# Patient Record
(demographics unavailable — no encounter records)

---

## 2024-10-14 NOTE — ASSESSMENT
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  Neurologic examination is reassuring today.   She is seizure free on her current dosing of Keppra and her EEG compared to prior seems largely unchanged.  Given that she is overall doing well in her development, schooling, and has no seizures on this current dose, we  can continue her current dose of medication and possibly obtain an EEG within 6 months time to assess if the background slowing has resolved.  Her genetics is still pending  for which I will follow up on.

## 2024-10-14 NOTE — DEVELOPMENTAL MILESTONES
[Brushes teeth, no help] : brushes teeth, no help [Dresses self, no help] : dresses self, no help [Imaginative play] : imaginative play [Plays board/card games] : plays board/card games [Interacts with peers] : interacts with peers [Draws person with 3 parts] : draws person with 3 parts [Copies a cross] : copies a cross [Copies a [place]] : copies a [place] [Uses 3 objects] : uses 3 objects [Knows first & last name, age, gender] : knows first & last name, age, gender [Understandable speech 100% of time] : understandable speech 100% of time [Knows 4 colors] : knows 4 colors [Defines 5 words] : defines 5 words [Names 4 colors] : names 4 colors [Knows 4 actions] : knows 4 actions [Hops on one foot] : hops on one foot [Balances on one foot for 3-5 seconds] : balances on one foot for 3-5 seconds [Prepares cereal] : does not prepare cereal [Knows 2 opposites] : does not know 2 opposites [FreeTextEntry3] : Gross Motor: runs, alternates feet while walking up steps Fine Motor: writes her own name, copies a cross/[place] Speech: can tell stories, no concerns from the teacher Social: makes friends easily in school

## 2024-10-14 NOTE — PHYSICAL EXAM
[Well-appearing] : well-appearing [Normocephalic] : normocephalic [No dysmorphic facial features] : no dysmorphic facial features [No ocular abnormalities] : no ocular abnormalities [Neck supple] : neck supple [Heart sounds regular in rate and rhythm] : heart sounds regular in rate and rhythm [No abnormal neurocutaneous stigmata or skin lesions] : no abnormal neurocutaneous stigmata or skin lesions [Straight] : straight [No faisal or dimples] : no faisal or dimples [No deformities] : no deformities [Alert] : alert [Well related, good eye contact] : well related, good eye contact [Conversant] : conversant [Normal speech and language] : normal speech and language [Follows instructions well] : follows instructions well [VFF] : VFF [Pupils reactive to light and accommodation] : pupils reactive to light and accommodation [Full extraocular movements] : full extraocular movements [No nystagmus] : no nystagmus [Normal facial sensation to light touch] : normal facial sensation to light touch [No facial asymmetry or weakness] : no facial asymmetry or weakness [Gross hearing intact] : gross hearing intact [Equal palate elevation] : equal palate elevation [Good shoulder shrug] : good shoulder shrug [Normal tongue movement] : normal tongue movement [Midline tongue, no fasciculations] : midline tongue, no fasciculations [Normal axial and appendicular muscle tone] : normal axial and appendicular muscle tone [Gets up on table without difficulty] : gets up on table without difficulty [No abnormal involuntary movements] : no abnormal involuntary movements [Walks and runs well] : walks and runs well [2+ biceps] : 2+ biceps [Triceps] : triceps [Knee jerks] : knee jerks [Ankle jerks] : ankle jerks [No ankle clonus] : no ankle clonus [No dysmetria on FTNT] : no dysmetria on FTNT [Good walking balance] : good walking balance [Normal gait] : normal gait [de-identified] : gets up from seated position on floor without assistance of hands; can stand on one foot and hop on each foot bilaterally [de-identified] : Intact to light touch bilaterally thoughout

## 2024-10-14 NOTE — REASON FOR VISIT
[Follow-Up Evaluation] : a follow-up evaluation for [Seizure Disorder] : seizure disorder [Parents] : parents [Interpreters_IDNumber] : [id number] [Interpreters_FullName] : Angel Bell [TWNoteComboBox1] : [place]

## 2024-10-14 NOTE — HISTORY OF PRESENT ILLNESS
[FreeTextEntry1] : Stefani is a 4-year-old little girl with unclassified focal epilepsy who is presenting for follow up.  She was last seen in clinic on July 29, 2024.  *********************************  At last visit, we had noted the following: In mid-July, Stefani presented to [facility] [place] for a seizure lasting 40 seconds.  She was loaded with 20 mg/kg of Keppra and her dose was increased to 500 mg BID (5 mL BID).  Her Keppra level at that time was 15.6.  This seizure appeared with facial grimacing, unresponsiveness, bilateral arm and leg stiffening.  Stefani remembers the start of the last seizure according to the parents.  It occurred at 1:40pm shortly after waking up from her nap.  Parents report that she moves around a lot while she sleeps according to parents.  She sometimes does wake up from these movements (it is not her limbs that wake her up) but she does not appear sleepy upon awakening.  Recommendations at last visit: [ ] Continue Keppra 500 mg BID (62 mg/kg/day) [ ] Labs today: CBC, CMP, Keppra Level, CRP, Ferritin, Comprehensive Epilepsy Panel [ ] Routine EEG followed by inpatient EEG - phone number given to parent to schedule [ ] Follow up 4-5 months.  *********************************  Interval History:  -She was recently admitted to the hospital for an inpatient Video EEG (results below) -After inpatient admission, the patient was advised to implement differential dosing of Keppra (400 mg AM/600 mg PM) -Ferritin level was 44 -Since discharge from the hospital, no further seizures  *********************************  Past history Reviewed [] Epilepsy Syndrome: focal epilepsy (SeLECTS?) [] Age of Diagnosis: 3 years of age [] Number of Life-Time Seizures: 1 [] History of Status Epilepticus: yes (20 minutes) [] Handedness: right-handed [] Semiology: left-side arm and leg shaking followed by full body shaking, with deviation of the eyes upward and drooling from the mouth.  Noted to have been weak on the left side after her seizure; occurred at nighttime as she was falling asleep. [] Last Event/Seizure: July 2024 [] Duration of Event/Seizure: longest 20 minutes (most recent was only 40 seconds) [] EEG (Last EEG done August 2023): - Sleep potentiated spikes right centroparietal-occipital spikes. [ ] EEG Inpatient (July 2024): - Occasional Cz/Pz midline spikes, that become more frequent during sleep, at times in runs up to 5 seconds during sleep.   - Mild generalized background slowing.  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

## 2024-10-14 NOTE — CONSULT LETTER
[Dear  ___] : Dear  [unfilled], [Consult Letter:] : I had the pleasure of evaluating your patient, [unfilled]. [Please see my note below.] : Please see my note below. [Consult Closing:] : Thank you very much for allowing me to participate in the care of this patient.  If you have any questions, please do not hesitate to contact me. [Sincerely,] : Sincerely, [FreeTextEntry3] : Angel Bell MD PGY-5, Child Neurology [facility] [address], [address] [place] [address]  Dr. Kirill Jeong Child Neurology Attending Physician [facility] [address], [address] [place] [address]

## 2024-10-14 NOTE — BIRTH HISTORY
[At Term] : at term [place] [Normal Vaginal Route] : by normal vaginal route [None] : there were no delivery complications [FreeTextEntry6] : none

## 2024-10-14 NOTE — QUALITY MEASURES
[Seizure frequency] : Seizure frequency: Yes [Etiology, seizure type, and epilepsy syndrome] : Etiology, seizure type, and epilepsy syndrome: Yes [Side effects of anti-seizure medications] : Side effects of anti-seizure medications: Yes [Safety and education around seizures] : Safety and education around seizures: Yes [Sudden unexpected death in epilepsy (SUDEP)] : Sudden unexpected death in epilepsy: Yes [Adherence to medication(s)] : Adherence to medication(s): Yes [Issues around driving] : Issues around driving: Not Applicable [Screening for anxiety, depression] : Screening for anxiety, depression: Not Applicable [Treatment-resistant epilepsy (every visit)] : Treatment-resistant epilepsy (every visit): Not Applicable [Counseling for women of childbearing potential with epilepsy (including folic acid supplement)] : Counseling for women of childbearing potential with epilepsy (including folic acid supplement): Not Applicable [Options for adjunctive therapy (Neurostimulation, CBD, Dietary Therapy, Epilepsy Surgery)] : Options for adjunctive therapy (Neurostimulation, CBD, Dietary Therapy, Epilepsy Surgery): Not Applicable [25 Hydroxy Vitamin D level assessed and Vitamin D3 ordered] : 25 Hydroxy Vitamin D level assessed and Vitamin D3 ordered: Not Applicable [Thyroid profile ordered] : Thyroid profile ordered: Not Applicable

## 2024-10-14 NOTE — PLAN
[FreeTextEntry1] : [ ] Continue Keppra 400 mg/600 mg (62 mg/kg/day) [ ] Follow up in 6 months or sooner if problems arise.  [ ] Will follow up on genetics results

## 2024-10-14 NOTE — END OF VISIT
[] : Resident [Time Spent: ___ minutes] : I have spent [unfilled] minutes of time on the encounter which excludes teaching and separately reported services. [FreeTextEntry3] :  Total time spent excludes teaching

## 2025-04-14 NOTE — PHYSICAL EXAM
[Well-appearing] : well-appearing [Normocephalic] : normocephalic [No dysmorphic facial features] : no dysmorphic facial features [No ocular abnormalities] : no ocular abnormalities [Neck supple] : neck supple [Soft] : soft [No abnormal neurocutaneous stigmata or skin lesions] : no abnormal neurocutaneous stigmata or skin lesions [Straight] : straight [No faisal or dimples] : no faisal or dimples [No deformities] : no deformities [Alert] : alert [Well related, good eye contact] : well related, good eye contact [Conversant] : conversant [Normal speech and language] : normal speech and language [Follows instructions well] : follows instructions well [VFF] : VFF [Pupils reactive to light and accommodation] : pupils reactive to light and accommodation [Full extraocular movements] : full extraocular movements [Saccadic and smooth pursuits intact] : saccadic and smooth pursuits intact [No nystagmus] : no nystagmus [Normal facial sensation to light touch] : normal facial sensation to light touch [No facial asymmetry or weakness] : no facial asymmetry or weakness [Gross hearing intact] : gross hearing intact [Equal palate elevation] : equal palate elevation [Good shoulder shrug] : good shoulder shrug [Normal tongue movement] : normal tongue movement [Midline tongue, no fasciculations] : midline tongue, no fasciculations [R handed] : R handed [Normal axial and appendicular muscle tone] : normal axial and appendicular muscle tone [Gets up on table without difficulty] : gets up on table without difficulty [No pronator drift] : no pronator drift [Normal finger tapping and fine finger movements] : normal finger tapping and fine finger movements [No abnormal involuntary movements] : no abnormal involuntary movements [Walks and runs well] : walks and runs well [Able to do deep knee bend] : able to do deep knee bend [Able to walk on heels] : able to walk on heels [Able to walk on toes] : able to walk on toes [2+ biceps] : 2+ biceps [Triceps] : triceps [Knee jerks] : knee jerks [Ankle jerks] : ankle jerks [No ankle clonus] : no ankle clonus [Bilaterally] : bilaterally [Localizes LT and temperature] : localizes LT and temperature [No dysmetria on FTNT] : no dysmetria on FTNT [Good walking balance] : good walking balance [Normal gait] : normal gait [Able to tandem well] : able to tandem well [Negative Romberg] : negative Romberg [de-identified] : Moves bilateral upper and lower extremities against gravity and withdraws from examiner with grossly intact strength

## 2025-04-14 NOTE — ASSESSMENT
[FreeTextEntry1] : 6yo with focal epilepsy (possibly SeLECTS) well controlled on keppra presenting for follow up. Neurologic exam today is intact. Has remained seizure free on keppra monotherapy, is tolerating well and reports good compliance. Will continue current dose. Will repeat EEGs to ensure background slowing has resolved. RTC in 6 months.

## 2025-04-14 NOTE — PLAN
[FreeTextEntry1] : [ ] Continue Keppra 400mg qAM, 600mg qPM (58mg/kg/day) [ ] rEEG and aEEG to assess for resolution of background slowing [ ] RTC in 6 months or earlier if any concerns

## 2025-04-14 NOTE — REASON FOR VISIT
[Follow-Up Evaluation] : a follow-up evaluation for [Seizure Disorder] : seizure disorder [Mother] : mother [Father] : father

## 2025-04-14 NOTE — QUALITY MEASURES
[Seizure frequency] : Seizure frequency: Yes [Etiology, seizure type, and epilepsy syndrome] : Etiology, seizure type, and epilepsy syndrome: Yes [Side effects of anti-seizure medications] : Side effects of anti-seizure medications: Yes [Safety and education around seizures] : Safety and education around seizures: Yes [Sudden unexpected death in epilepsy (SUDEP)] : Sudden unexpected death in epilepsy: Yes [Treatment-resistant epilepsy (every visit)] : Treatment-resistant epilepsy (every visit): Yes [Adherence to medication(s)] : Adherence to medication(s): Yes [Issues around driving] : Issues around driving: Not Applicable [Screening for anxiety, depression] : Screening for anxiety, depression: Not Applicable [Counseling for women of childbearing potential with epilepsy (including folic acid supplement)] : Counseling for women of childbearing potential with epilepsy (including folic acid supplement): Not Applicable [Options for adjunctive therapy (Neurostimulation, CBD, Dietary Therapy, Epilepsy Surgery)] : Options for adjunctive therapy (Neurostimulation, CBD, Dietary Therapy, Epilepsy Surgery): Not Applicable [25 Hydroxy Vitamin D level assessed and Vitamin D3 ordered] : 25 Hydroxy Vitamin D level assessed and Vitamin D3 ordered: Not Applicable [Thyroid profile ordered] : Thyroid profile ordered: Not Applicable

## 2025-04-14 NOTE — HISTORY OF PRESENT ILLNESS
[FreeTextEntry1] : Stefani is a 5-year-old RH girl with unclassified focal epilepsy who is presenting for follow up. She was last seen in clinic Oct 14, 2024.  Interval Hx: At last visit, she was noted to be seizure free on keppra 400mg/600mg (62mg/kg/day). Discussed at that time repeating EEG 2025 to assess if background slowing has resolved. Continues to be seizure free.   [] Current ASMs: -Levetiracetam 400 mg (4 mL) qAM, 600 mg (6 mL) qPM (58 mg/kg/day)  Past history Reviewed [] Epilepsy Syndrome: focal epilepsy (SeLECTS? Centrotemporal spikes with BILLY seizures occurring out of sleep) [] Age of Diagnosis: 3 years of age [] Number of Life-Time Seizures: 1 [] History of Status Epilepticus: yes (20 minutes) [] Handedness: right-handed [] Semiology: left-side arm and leg shaking followed by full body shaking, with deviation of the eyes upward and drooling from the mouth. Noted to have been weak on the left side after her seizure; occurred at nighttime as she was falling asleep. [] Last Event/Seizure: 2024 [] Duration of Event/Seizure: longest 20 minutes (most recent was only 40 seconds) [] EEG (2023): - Sleep potentiated spikes right centroparietal-occipital spikes. [ ] EEG Inpatient (2024): - Occasional Cz/Pz midline spikes, that become more frequent during sleep, at times in runs up to 5 seconds during sleep. - Mild generalized background slowing. 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   In mid-2024, Stfeani presented to [facility] [place] for a seizure lasting 40 seconds. She was loaded with 20 mg/kg of Keppra and her dose was increased to 500 mg BID (5 mL BID). Her Keppra level at that time was 15.6. This seizure appeared with facial grimacing, unresponsiveness, bilateral arm and leg stiffening. Stefani remembers the start of the last seizure according to the parents. It occurred at 1:40pm shortly after waking up from her nap.  Parents report that she moves around a lot while she sleeps according to parents. She sometimes does wake up from these movements (it is not her limbs that wake her up) but she does not appear sleepy upon awakening.

## 2025-04-14 NOTE — CONSULT LETTER
[Dear  ___] : Dear  [unfilled], [Consult Letter:] : I had the pleasure of evaluating your patient, [unfilled]. [Please see my note below.] : Please see my note below. [Consult Closing:] : Thank you very much for allowing me to participate in the care of this patient.  If you have any questions, please do not hesitate to contact me. [Sincerely,] : Sincerely, [FreeTextEntry3] : Marco Antonio Willis MD PGY-4, Child Neurology [facility] [address], [address] [place] [address]  Dr. Kirill Jeong Child Neurology Attending Physician [facility] [address], [address] [place] [address]

## 2025-05-19 NOTE — ASSESSMENT
[FreeTextEntry1] : Stefani is a 5-year-old, right-handed little girl with focal epilepsy (possibly a self-limited epilepsy?) presenting today for follow up.  Neurologic examination is reassuring today with no strength or sensory deficits.  It is likely that Stefani's epilepsy is self-limited given the shifting seizure foci/spike burden on her EEG.  Additionally, prior slowing that was seen on EEG has resolved.  It is possible that her epilepsy is genetic in etiology, although her prior [facility] Comprehensive Epilepsy Panel showed only VOUS in AT.  We will follow up with our office staff to ensure that parental samples have resulted, as parents report today that they sent in the samples a while back.   Given that she has no further seizures, we will continue her Keppra at the current dose of 400 mg in the morning and 600 mg in the evening.  We are approaching one year of seizure freedom.  If she continues to be seizure-free we will likely discuss discontinuing the medication if her repeat ambulatory EEG next year is reassuring.    Academically, it seems that Stefani may be struggling due to issues of inattention.  Her [employer] [place] screening (which is scanned in) was positive for inattentive type ADHD.  She currently does not have accommodations in place in school.  Given this, Stefani would benefit from a 504 to help her in her academics.

## 2025-05-19 NOTE — PHYSICAL EXAM
[Well-appearing] : well-appearing [Normocephalic] : normocephalic [No dysmorphic facial features] : no dysmorphic facial features [No ocular abnormalities] : no ocular abnormalities [Neck supple] : neck supple [Soft] : soft [No abnormal neurocutaneous stigmata or skin lesions] : no abnormal neurocutaneous stigmata or skin lesions [Straight] : straight [No faisal or dimples] : no faisal or dimples [No deformities] : no deformities [Alert] : alert [Well related, good eye contact] : well related, good eye contact [Conversant] : conversant [Normal speech and language] : normal speech and language [Follows instructions well] : follows instructions well [VFF] : VFF [Pupils reactive to light and accommodation] : pupils reactive to light and accommodation [Full extraocular movements] : full extraocular movements [Saccadic and smooth pursuits intact] : saccadic and smooth pursuits intact [No nystagmus] : no nystagmus [Normal facial sensation to light touch] : normal facial sensation to light touch [No facial asymmetry or weakness] : no facial asymmetry or weakness [Gross hearing intact] : gross hearing intact [Equal palate elevation] : equal palate elevation [Good shoulder shrug] : good shoulder shrug [Normal tongue movement] : normal tongue movement [Midline tongue, no fasciculations] : midline tongue, no fasciculations [R handed] : R handed [Normal axial and appendicular muscle tone] : normal axial and appendicular muscle tone [Gets up on table without difficulty] : gets up on table without difficulty [No pronator drift] : no pronator drift [Normal finger tapping and fine finger movements] : normal finger tapping and fine finger movements [No abnormal involuntary movements] : no abnormal involuntary movements [Walks and runs well] : walks and runs well [Able to do deep knee bend] : able to do deep knee bend [Able to walk on heels] : able to walk on heels [Able to walk on toes] : able to walk on toes [2+ biceps] : 2+ biceps [Triceps] : triceps [Knee jerks] : knee jerks [Ankle jerks] : ankle jerks [No ankle clonus] : no ankle clonus [Bilaterally] : bilaterally [No dysmetria on FTNT] : no dysmetria on FTNT [Good walking balance] : good walking balance [Normal gait] : normal gait [de-identified] : Moves bilateral upper and lower extremities against gravity and withdraws from examiner with grossly intact strength [de-identified] : responds to tick and touch

## 2025-05-19 NOTE — CONSULT LETTER
[Dear  ___] : Dear  [unfilled], [Consult Letter:] : I had the pleasure of evaluating your patient, [unfilled]. [Please see my note below.] : Please see my note below. [Consult Closing:] : Thank you very much for allowing me to participate in the care of this patient.  If you have any questions, please do not hesitate to contact me. [Sincerely,] : Sincerely, [FreeTextEntry3] : Angel Bell MD PGY-5, Pediatric Neurology [facility] [address], [address] [place] [address]  Dr. Antonieta Ruano Child Neurology Attending Physician [facility] [address], [address] [place] [address]

## 2025-05-19 NOTE — HISTORY OF PRESENT ILLNESS
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  She was recently admitted to [facility] for background assessment.    Interval History: No further seizures since 2024.  She was recently admitted to the hospital for background assessment of her EEG (EEG read below).  Mother brought in [employer] [employer]oring Assessments from school and home.  She continues to make progress in school but teachers report that she has difficulty focusing.  Current medications (weight today is 17 kg): -Levetiracetam 400 mg AM/600 mg PM (59 mg/kg/day)  Epilepsy History [] Epilepsy Syndrome: focal epilepsy (SeLFE? Centrotemporal spikes with BILLY seizures occurring out of sleep) [] Age of Diagnosis: 3 years of age [] Number of Life-Time Seizures: 2 [] History of Status Epilepticus: yes (20 minutes) [] Handedness: right-handed [] Semiology: left-side arm and leg shaking followed by full body shaking, with deviation of the eyes upward and drooling from the mouth. Noted to have been weak on the left side after her seizure; occurred at nighttime as she was falling asleep. [] Last Event/Seizure: 2024 [] Duration of Event/Seizure: longest 20 minutes (most recent was only 40 seconds) [] EEG (2023): - Sleep potentiated spikes right centroparietal-occipital spikes. [ ] EEG Inpatient (2024): - Occasional Cz/Pz midline spikes, that become more frequent during sleep, at times in runs up to 5 seconds during sleep. - Mild generalized background slowing. [ ] EEG Inpatient (2025): -Frequent, sleep-potentiated, independent left centrotemporal and posterior midline spikes, Cz/Pz>C3/P3/T3/T5 spikes with field, at times, to the right centrotemporal region, often occurring in runs of 4-5 seconds during sleep.  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 [] Levels: Levetiracetam 15.5 (2025)

## 2025-05-19 NOTE — REASON FOR VISIT
[Follow-Up Evaluation] : a follow-up evaluation for [Seizure Disorder] : seizure disorder [Parents] : parents [Language Line ] : provided by Language Line   [Interpreters_IDNumber] : [id number] [Interpreters_FullName] : Angel Devlin [TWNoteComboBox1] : Norwegian

## 2025-05-19 NOTE — ASSESSMENT
[FreeTextEntry1] : Stefani is a 5-year-old, right-handed little girl with focal epilepsy (possibly a self-limited epilepsy?) presenting today for follow up.  Neurologic examination is reassuring today with no strength or sensory deficits.  It is likely that Stefani's epilepsy is self-limited given the shifting seizure foci/spike burden on her EEG.  Additionally, prior slowing that was seen on EEG has resolved.  It is possible that her epilepsy is genetic in etiology, although her prior [facility] Comprehensive Epilepsy Panel showed only VOUS in AT.  We will follow up with our office staff to ensure that parental samples have resulted, as parents report today that they sent in the samples a while back.   Given that she has no further seizures, we will continue her Keppra at the current dose of 400 mg in the morning and 600 mg in the evening.  We are approaching one year of seizure freedom.  If she continues to be seizure-free we will likely discuss discontinuing the medication if her repeat ambulatory EEG next year is reassuring.    Academically, it seems that Stefani may be struggling due to issues of inattention.  Her [employer] [place] screening (which is scanned in) was positive for inattentive type ADHD.  She currently does not have accommodations in place in school.  Given this, Stefani would benefit from a 504 to help her in her academics.

## 2025-05-19 NOTE — REASON FOR VISIT
[Follow-Up Evaluation] : a follow-up evaluation for [Seizure Disorder] : seizure disorder [Parents] : parents [Language Line ] : provided by Language [employer]   [Interpreters_IDNumber] : [id number] [Interpreters_FullName] : Angel Devlin [TWNoteComboBox1] : French

## 2025-05-19 NOTE — PLAN
[FreeTextEntry1] : [ ] Continue Levetiracetam 400 mg in the morning and 600 mg in the evening (59 mg/kg/day) [ ] Will follow up with office staff to track results of parental genetic testing [ ] Will write letter requesting 504 Educational Plan [ ] Follow up in 6 months, at which point we can repeat routine EEG at time of appointment (in preparation for ambulatory EEG one year from today).

## 2025-05-19 NOTE — CONSULT LETTER
[Dear  ___] : Dear  [unfilled], [Consult Letter:] : I had the pleasure of evaluating your patient, [unfilled]. [Please see my note below.] : Please see my note below. [Consult Closing:] : Thank you very much for allowing me to participate in the care of this patient.  If you have any questions, please do not hesitate to contact me. [Sincerely,] : Sincerely, [FreeTextEntry3] : Angel Bell MD PGY-5, Pediatric Neurology [facility] [address], [address] [place] [address]  Dr. Antonieta Ruano Child Neurology Attending Physician [facility] [address], [address] [place] [address]

## 2025-05-19 NOTE — PHYSICAL EXAM
[Well-appearing] : well-appearing [Normocephalic] : normocephalic [No dysmorphic facial features] : no dysmorphic facial features [No ocular abnormalities] : no ocular abnormalities [Neck supple] : neck supple [Soft] : soft [No abnormal neurocutaneous stigmata or skin lesions] : no abnormal neurocutaneous stigmata or skin lesions [Straight] : straight [No faisal or dimples] : no faisal or dimples [No deformities] : no deformities [Alert] : alert [Well related, good eye contact] : well related, good eye contact [Conversant] : conversant [Normal speech and language] : normal speech and language [Follows instructions well] : follows instructions well [VFF] : VFF [Pupils reactive to light and accommodation] : pupils reactive to light and accommodation [Full extraocular movements] : full extraocular movements [Saccadic and smooth pursuits intact] : saccadic and smooth pursuits intact [No nystagmus] : no nystagmus [Normal facial sensation to light touch] : normal facial sensation to light touch [No facial asymmetry or weakness] : no facial asymmetry or weakness [Gross hearing intact] : gross hearing intact [Equal palate elevation] : equal palate elevation [Good shoulder shrug] : good shoulder shrug [Normal tongue movement] : normal tongue movement [Midline tongue, no fasciculations] : midline tongue, no fasciculations [R handed] : R handed [Normal axial and appendicular muscle tone] : normal axial and appendicular muscle tone [Gets up on table without difficulty] : gets up on table without difficulty [No pronator drift] : no pronator drift [Normal finger tapping and fine finger movements] : normal finger tapping and fine finger movements [No abnormal involuntary movements] : no abnormal involuntary movements [Walks and runs well] : walks and runs well [Able to do deep knee bend] : able to do deep knee bend [Able to walk on heels] : able to walk on heels [Able to walk on toes] : able to walk on toes [2+ biceps] : 2+ biceps [Triceps] : triceps [Knee jerks] : knee jerks [Ankle jerks] : ankle jerks [No ankle clonus] : no ankle clonus [Bilaterally] : bilaterally [No dysmetria on FTNT] : no dysmetria on FTNT [Good walking balance] : good walking balance [Normal gait] : normal gait [de-identified] : Moves bilateral upper and lower extremities against gravity and withdraws from examiner with grossly intact strength [de-identified] : responds to tick and touch

## 2025-05-19 NOTE — HISTORY OF PRESENT ILLNESS
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  She was [place] [facility] [place].    Interval History: No further seizures since 2024.  She was recently admitted to the hospital for background assessment of her EEG (EEG read below).  Mother brought in [employer] Scoring Assessments from school and home.  She continues to make progress in school but teachers report that she has difficulty focusing.  Current medications (weight today is 17 kg): -Levetiracetam 400 mg AM/600 mg PM (59 mg/kg/day)  Epilepsy History [] Epilepsy Syndrome: focal epilepsy (SeLFE? Centrotemporal spikes with BILLY seizures occurring out of sleep) [] Age of Diagnosis: 3 years of age [] Number of Life-Time Seizures: 2 [] History of Status Epilepticus: yes (20 minutes) [] Handedness: right-handed [] Semiology: left-side arm and leg shaking followed by full body shaking, with deviation of the eyes upward and drooling from the mouth. Noted to have been weak on the left side after her seizure; occurred at nighttime as she was falling asleep. [] Last Event/Seizure: 2024 [] Duration of Event/Seizure: longest 20 minutes (most recent was only 40 seconds) [] EEG (2023): - Sleep potentiated spikes right centroparietal-occipital spikes. [ ] EEG Inpatient (2024): - Occasional Cz/Pz midline spikes, that become more frequent during sleep, at times in runs up to 5 seconds during sleep. - Mild generalized background slowing. [ ] EEG Inpatient (2025): -Frequent, sleep-potentiated, independent left centrotemporal and posterior midline spikes, Cz/Pz>C3/P3/T3/T5 spikes with field, at times, to the right centrotemporal region, often occurring in runs of 4-5 seconds during sleep.  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 [] Levels: Levetiracetam 15.5 (2025)